# Patient Record
Sex: MALE | Race: WHITE | NOT HISPANIC OR LATINO | Employment: OTHER | URBAN - METROPOLITAN AREA
[De-identification: names, ages, dates, MRNs, and addresses within clinical notes are randomized per-mention and may not be internally consistent; named-entity substitution may affect disease eponyms.]

---

## 2022-01-27 ENCOUNTER — LAB REQUISITION (OUTPATIENT)
Dept: LAB | Facility: HOSPITAL | Age: 67
End: 2022-01-27

## 2022-01-27 DIAGNOSIS — R05.9 COUGH, UNSPECIFIED: ICD-10-CM

## 2022-01-27 LAB — SARS-COV-2 RNA PNL SPEC NAA+PROBE: NOT DETECTED

## 2022-01-27 PROCEDURE — U0004 COV-19 TEST NON-CDC HGH THRU: HCPCS | Performed by: INTERNAL MEDICINE

## 2022-07-07 ENCOUNTER — TELEPHONE (OUTPATIENT)
Dept: GASTROENTEROLOGY | Facility: CLINIC | Age: 67
End: 2022-07-07

## 2022-07-07 NOTE — TELEPHONE ENCOUNTER
Called pt at 01:00pm and again at 01:20pm for Davies call, could not leave a voice mail due to VM box not being set up. If pt calls back we need reschedule his appointment. Wendy

## 2023-04-28 ENCOUNTER — OFFICE VISIT (OUTPATIENT)
Dept: ORTHOPEDIC SURGERY | Facility: CLINIC | Age: 68
End: 2023-04-28
Payer: MEDICARE

## 2023-04-28 VITALS — BODY MASS INDEX: 30.54 KG/M2 | WEIGHT: 238 LBS | HEIGHT: 74 IN

## 2023-04-28 DIAGNOSIS — M17.0 BILATERAL PRIMARY OSTEOARTHRITIS OF KNEE: ICD-10-CM

## 2023-04-28 DIAGNOSIS — M25.561 PAIN IN BOTH KNEES, UNSPECIFIED CHRONICITY: Primary | ICD-10-CM

## 2023-04-28 DIAGNOSIS — M25.562 PAIN IN BOTH KNEES, UNSPECIFIED CHRONICITY: Primary | ICD-10-CM

## 2023-04-28 RX ORDER — ALBUTEROL SULFATE 90 UG/1
AEROSOL, METERED RESPIRATORY (INHALATION)
COMMUNITY
Start: 2023-01-13

## 2023-04-28 RX ORDER — TRIAMCINOLONE ACETONIDE 40 MG/ML
40 INJECTION, SUSPENSION INTRA-ARTICULAR; INTRAMUSCULAR
Status: COMPLETED | OUTPATIENT
Start: 2023-04-28 | End: 2023-04-28

## 2023-04-28 RX ORDER — LIDOCAINE HYDROCHLORIDE 10 MG/ML
5 INJECTION, SOLUTION EPIDURAL; INFILTRATION; INTRACAUDAL; PERINEURAL
Status: COMPLETED | OUTPATIENT
Start: 2023-04-28 | End: 2023-04-28

## 2023-04-28 RX ORDER — POTASSIUM CHLORIDE 750 MG/1
20 TABLET, FILM COATED, EXTENDED RELEASE ORAL 2 TIMES DAILY
COMMUNITY

## 2023-04-28 RX ORDER — ASPIRIN 81 MG/1
81 TABLET ORAL DAILY
COMMUNITY

## 2023-04-28 RX ORDER — AMLODIPINE BESYLATE 10 MG/1
1 TABLET ORAL DAILY
COMMUNITY
Start: 2023-04-04

## 2023-04-28 RX ORDER — IPRATROPIUM BROMIDE AND ALBUTEROL SULFATE 2.5; .5 MG/3ML; MG/3ML
SOLUTION RESPIRATORY (INHALATION)
COMMUNITY
Start: 2023-02-07

## 2023-04-28 RX ORDER — FUROSEMIDE 40 MG/1
TABLET ORAL
COMMUNITY
Start: 2023-03-06

## 2023-04-28 RX ADMIN — TRIAMCINOLONE ACETONIDE 40 MG: 40 INJECTION, SUSPENSION INTRA-ARTICULAR; INTRAMUSCULAR at 09:45

## 2023-04-28 RX ADMIN — LIDOCAINE HYDROCHLORIDE 5 ML: 10 INJECTION, SOLUTION EPIDURAL; INFILTRATION; INTRACAUDAL; PERINEURAL at 09:45

## 2023-04-28 NOTE — PROGRESS NOTES
"Chief Complaint  Pain and Initial Evaluation of the Left Knee and Pain and Initial Evaluation of the Right Knee     Subjective      Harry Mistry presents to [facility] for evaluation of the bilateral knees. He reports bilateral knee pain, right worse than the left knee. He uses topicals for his pain with some relief. He denies previous injury. He denies numbness and tingling.     Allergies   Allergen Reactions   • Naproxen GI Intolerance        Social History     Socioeconomic History   • Marital status: Single   Tobacco Use   • Smoking status: Every Day     Packs/day: 0.50     Years: 20.00     Pack years: 10.00     Types: Cigarettes   • Smokeless tobacco: Never        Review of Systems     Objective   Vital Signs:   Ht 188 cm (74\")   Wt 108 kg (238 lb)   BMI 30.56 kg/m²       Physical Exam  Constitutional:       Appearance: Normal appearance. The patient is well-developed and normal weight.   HENT:      Head: Normocephalic.      Right Ear: Hearing and external ear normal.      Left Ear: Hearing and external ear normal.      Nose: Nose normal.   Eyes:      Conjunctiva/sclera: Conjunctivae normal.   Cardiovascular:      Rate and Rhythm: Normal rate.   Pulmonary:      Effort: Pulmonary effort is normal.      Breath sounds: No wheezing or rales.   Abdominal:      Palpations: Abdomen is soft.      Tenderness: There is no abdominal tenderness.   Musculoskeletal:      Cervical back: Normal range of motion.   Skin:     Findings: No rash.   Neurological:      Mental Status: The patient is alert and oriented to person, place, and time.   Psychiatric:         Mood and Affect: Mood and affect normal.         Judgment: Judgment normal.       Ortho Exam    Right knee- ROM -10 to 115 mild laxity to varus/valgus stress. 1+ effusion. Stable to anterior/posterior drawer. Tender to the posterior knee. Neurovascularly intact. Positive EHL, FHL, GS and TA. Sensation intact to all 5 nerves of the " foot. Positive pulses. Negative Suellen's. Negative Lachman's.     Left knee- ROM -5 to 120 degrees. Mild swelling. Stable to varus/valgus stress. Stable to anterior/posterior drawer. Negative Lachman's. Positive EHL, FHL, GS and TA. Sensation intact to all 5 nerves of the foot. Positive pulses. Mild varus deformity. Negative Suellen's.     Large Joint Arthrocentesis: R knee  Date/Time: 4/28/2023 9:45 AM  Consent given by: patient  Site marked: site marked  Timeout: Immediately prior to procedure a time out was called to verify the correct patient, procedure, equipment, support staff and site/side marked as required   Supporting Documentation  Indications: pain   Procedure Details  Location: knee - R knee  Needle gauge: 21g.  Medications administered: 5 mL lidocaine PF 1% 1 %; 40 mg triamcinolone acetonide 40 MG/ML  Patient tolerance: patient tolerated the procedure well with no immediate complications    Large Joint Arthrocentesis: L knee  Date/Time: 4/28/2023 9:45 AM  Consent given by: patient  Site marked: site marked  Timeout: Immediately prior to procedure a time out was called to verify the correct patient, procedure, equipment, support staff and site/side marked as required   Supporting Documentation  Indications: pain   Procedure Details  Location: knee - L knee  Needle gauge: 21g.  Medications administered: 5 mL lidocaine PF 1% 1 %; 40 mg triamcinolone acetonide 40 MG/ML  Patient tolerance: patient tolerated the procedure well with no immediate complications          X-Ray Report:  Right knee X-Ray  Indication: Evaluation of right knee pain  AP/Lateral, Standing and Sunrise view(s)  Findings: advanced degenerative changes with bone on bone to the medial compartment. No acute fracture. Varus deformity. Small effusion.   Prior studies available for comparison: no     X-Ray Report:  Left knee X-Ray  Indication: Evaluation of left knee pain  AP/Lateral, Standing and Sunrise view(s)  Findings:  advanced  degenerative changes with bone on bone to the medial compartment. No acute fracture. Varus deformity. Small effusion.   Prior studies available for comparison: no         Imaging Results (Most Recent)     Procedure Component Value Units Date/Time    XR Knee 3 View Right [[id number]] Resulted: 04/28/23 0919     Updated: 04/28/23 0923    XR Knee 3 View Left [[id number]] Resulted: 04/28/23 0919     Updated: 04/28/23 0923           Result Review :       No results found.           Assessment and Plan     Diagnoses and all orders for this visit:    1. Pain in both knees, unspecified chronicity (Primary)  -     XR Knee 3 View Left  -     XR Knee 3 View Right    2. Bilateral primary osteoarthritis of knee        Discussed the treatment options with the patient, operative vs non-operative. I reviewed the x-rays that were obtained today with the patient. Discussed the risks and benefits of bilateral knee steroid injections. The patient expressed understanding and wished to proceed. He tolerated the injections well. Home exercises given today.     Educated on risk of smoking. Discussed options for smoking cessation. and Call or return if worsening symptoms.    Follow Up     3 months    Patient was given instructions and counseling regarding his condition or for health maintenance advice. Please see specific information pulled into the AVS if appropriate.     Scribed for Harsha Feliciano MD by Coleen Paige.  04/28/23   09:30 EDT    I have personally performed the services described in this document as scribed by the above individual and it is both accurate and complete. Harsha Feliciano MD 04/28/23

## 2023-05-26 ENCOUNTER — TELEPHONE (OUTPATIENT)
Dept: ORTHOPEDIC SURGERY | Facility: CLINIC | Age: 68
End: 2023-05-26
Payer: MEDICARE

## 2023-05-26 RX ORDER — DICLOFENAC SODIUM 75 MG/1
75 TABLET, DELAYED RELEASE ORAL 2 TIMES DAILY
Qty: 60 TABLET | Refills: 2 | Status: SHIPPED | OUTPATIENT
Start: 2023-05-26

## 2025-01-01 ENCOUNTER — APPOINTMENT (OUTPATIENT)
Dept: GENERAL RADIOLOGY | Facility: HOSPITAL | Age: 70
End: 2025-01-01
Payer: MEDICARE

## 2025-01-01 ENCOUNTER — HOSPITAL ENCOUNTER (INPATIENT)
Facility: HOSPITAL | Age: 70
LOS: 1 days | End: 2025-02-06
Attending: FAMILY MEDICINE | Admitting: FAMILY MEDICINE
Payer: MEDICARE

## 2025-01-01 ENCOUNTER — APPOINTMENT (OUTPATIENT)
Dept: NEUROLOGY | Facility: HOSPITAL | Age: 70
End: 2025-01-01
Payer: MEDICARE

## 2025-01-01 ENCOUNTER — APPOINTMENT (OUTPATIENT)
Dept: CT IMAGING | Facility: HOSPITAL | Age: 70
End: 2025-01-01
Payer: MEDICARE

## 2025-01-01 ENCOUNTER — HOSPITAL ENCOUNTER (INPATIENT)
Dept: OTHER | Facility: HOSPITAL | Age: 70
Discharge: HOME | End: 2025-02-05
Payer: MEDICARE

## 2025-01-01 VITALS
DIASTOLIC BLOOD PRESSURE: 59 MMHG | TEMPERATURE: 101.3 F | HEIGHT: 74 IN | RESPIRATION RATE: 26 BRPM | OXYGEN SATURATION: 55 % | BODY MASS INDEX: 24.36 KG/M2 | WEIGHT: 189.82 LBS | SYSTOLIC BLOOD PRESSURE: 80 MMHG

## 2025-01-01 LAB
ALBUMIN SERPL-MCNC: 2.8 G/DL (ref 3.5–5.2)
ALBUMIN/GLOB SERPL: 0.8 G/DL
ALP SERPL-CCNC: 347 U/L (ref 39–117)
ALT SERPL W P-5'-P-CCNC: 372 U/L (ref 1–41)
AMMONIA BLD-SCNC: 39 UMOL/L (ref 16–60)
ANION GAP SERPL CALCULATED.3IONS-SCNC: 21 MMOL/L (ref 5–15)
ARTERIAL PATENCY WRIST A: ABNORMAL
ARTERIAL PATENCY WRIST A: POSITIVE
AST SERPL-CCNC: 779 U/L (ref 1–40)
ATMOSPHERIC PRESS: 735.7 MMHG
ATMOSPHERIC PRESS: 737.4 MMHG
B PARAPERT DNA SPEC QL NAA+PROBE: NOT DETECTED
B PERT DNA SPEC QL NAA+PROBE: NOT DETECTED
BACTERIA UR QL AUTO: ABNORMAL /HPF
BASE EXCESS BLDA CALC-SCNC: -13.2 MMOL/L (ref -2–2)
BASE EXCESS BLDA CALC-SCNC: -8.1 MMOL/L (ref -2–2)
BDY SITE: ABNORMAL
BDY SITE: ABNORMAL
BILIRUB SERPL-MCNC: 0.5 MG/DL (ref 0–1.2)
BILIRUB UR QL STRIP: ABNORMAL
BUN SERPL-MCNC: 24 MG/DL (ref 8–23)
BUN/CREAT SERPL: 17 (ref 7–25)
C PNEUM DNA NPH QL NAA+NON-PROBE: NOT DETECTED
CA-I BLDA-SCNC: 1.25 MMOL/L (ref 1.13–1.32)
CALCIUM SPEC-SCNC: 8.3 MG/DL (ref 8.6–10.5)
CHLORIDE BLDA-SCNC: 101 MMOL/L (ref 98–107)
CHLORIDE SERPL-SCNC: 96 MMOL/L (ref 98–107)
CLARITY UR: CLEAR
CO2 SERPL-SCNC: 21 MMOL/L (ref 22–29)
COD CRY URNS QL: ABNORMAL /HPF
COLOR UR: ABNORMAL
CREAT SERPL-MCNC: 1.41 MG/DL (ref 0.76–1.27)
D-LACTATE SERPL-SCNC: 5.6 MMOL/L (ref 0.5–2)
D-LACTATE SERPL-SCNC: 8.3 MMOL/L (ref 0.5–2)
D-LACTATE SERPL-SCNC: 9 MMOL/L
D-LACTATE SERPL-SCNC: 9.9 MMOL/L (ref 0.5–2)
DEPRECATED RDW RBC AUTO: 58.4 FL (ref 37–54)
EGFRCR SERPLBLD CKD-EPI 2021: 54.6 ML/MIN/1.73
ERYTHROCYTE [DISTWIDTH] IN BLOOD BY AUTOMATED COUNT: 16 % (ref 12.3–15.4)
FLUAV SUBTYP SPEC NAA+PROBE: NOT DETECTED
FLUBV RNA ISLT QL NAA+PROBE: NOT DETECTED
GLOBULIN UR ELPH-MCNC: 3.3 GM/DL
GLUCOSE BLDC GLUCOMTR-MCNC: 176 MG/DL (ref 70–99)
GLUCOSE BLDC GLUCOMTR-MCNC: 208 MG/DL (ref 70–99)
GLUCOSE SERPL-MCNC: 183 MG/DL (ref 65–99)
GLUCOSE UR STRIP-MCNC: NEGATIVE MG/DL
HADV DNA SPEC NAA+PROBE: NOT DETECTED
HCO3 BLDA-SCNC: 20.8 MMOL/L (ref 22–26)
HCO3 BLDA-SCNC: 23.4 MMOL/L (ref 22–26)
HCOV 229E RNA SPEC QL NAA+PROBE: NOT DETECTED
HCOV HKU1 RNA SPEC QL NAA+PROBE: NOT DETECTED
HCOV NL63 RNA SPEC QL NAA+PROBE: NOT DETECTED
HCOV OC43 RNA SPEC QL NAA+PROBE: DETECTED
HCT VFR BLD AUTO: 43.4 % (ref 37.5–51)
HCT VFR BLD CALC: 41 % (ref 38–51)
HCT VFR BLD CALC: 41 % (ref 38–51)
HEMODILUTION: NO
HEMODILUTION: NO
HGB BLD-MCNC: 12.6 G/DL (ref 13–17.7)
HGB BLDA-MCNC: 14 G/DL (ref 12–18)
HGB BLDA-MCNC: 14.1 G/DL (ref 12–18)
HGB UR QL STRIP.AUTO: NEGATIVE
HMPV RNA NPH QL NAA+NON-PROBE: NOT DETECTED
HPIV1 RNA ISLT QL NAA+PROBE: NOT DETECTED
HPIV2 RNA SPEC QL NAA+PROBE: NOT DETECTED
HPIV3 RNA NPH QL NAA+PROBE: NOT DETECTED
HPIV4 P GENE NPH QL NAA+PROBE: NOT DETECTED
HYALINE CASTS UR QL AUTO: ABNORMAL /LPF
INHALED O2 CONCENTRATION: 100 %
INHALED O2 CONCENTRATION: 90 %
KETONES UR QL STRIP: NEGATIVE
L PNEUMO1 AG UR QL IA: NEGATIVE
LEUKOCYTE ESTERASE UR QL STRIP.AUTO: NEGATIVE
LYMPHOCYTES # BLD MANUAL: 1.05 10*3/MM3 (ref 0.7–3.1)
LYMPHOCYTES NFR BLD MANUAL: 9 % (ref 5–12)
Lab: ABNORMAL
M PNEUMO IGG SER IA-ACNC: NOT DETECTED
MAGNESIUM SERPL-MCNC: 2 MG/DL (ref 1.6–2.4)
MCH RBC QN AUTO: 28.4 PG (ref 26.6–33)
MCHC RBC AUTO-ENTMCNC: 29 G/DL (ref 31.5–35.7)
MCV RBC AUTO: 98 FL (ref 79–97)
METAMYELOCYTES NFR BLD MANUAL: 2 % (ref 0–0)
MODALITY: ABNORMAL
MODALITY: ABNORMAL
MONOCYTES # BLD: 3.14 10*3/MM3 (ref 0.1–0.9)
MRSA DNA SPEC QL NAA+PROBE: ABNORMAL
MUCOUS THREADS URNS QL MICRO: ABNORMAL /HPF
NEUTROPHILS # BLD AUTO: 30.01 10*3/MM3 (ref 1.7–7)
NEUTROPHILS NFR BLD MANUAL: 77 % (ref 42.7–76)
NEUTS BAND NFR BLD MANUAL: 9 % (ref 0–5)
NITRITE UR QL STRIP: NEGATIVE
NOTIFIED WHO: ABNORMAL
NT-PROBNP SERPL-MCNC: 3910 PG/ML (ref 0–900)
PCO2 BLDA: 77.4 MM HG (ref 35–45)
PCO2 BLDA: 93.2 MM HG (ref 35–45)
PEEP RESPIRATORY: 5 CM[H2O]
PEEP RESPIRATORY: 5 CM[H2O]
PH BLDA: 6.96 PH UNITS (ref 7.35–7.45)
PH BLDA: 7.09 PH UNITS (ref 7.35–7.45)
PH UR STRIP.AUTO: 5.5 [PH] (ref 5–8)
PHOSPHATE SERPL-MCNC: 8.8 MG/DL (ref 2.5–4.5)
PLATELET # BLD AUTO: 404 10*3/MM3 (ref 140–450)
PMV BLD AUTO: 10.8 FL (ref 6–12)
PO2 BLD: 84 MM[HG] (ref 0–500)
PO2 BLD: 99 MM[HG] (ref 0–500)
PO2 BLDA: 84 MM HG (ref 80–100)
PO2 BLDA: 88.7 MM HG (ref 80–100)
POTASSIUM BLDA-SCNC: 4.3 MMOL/L (ref 3.5–5)
POTASSIUM SERPL-SCNC: 4.2 MMOL/L (ref 3.5–5.2)
PROCALCITONIN SERPL-MCNC: 5.54 NG/ML (ref 0–0.25)
PROT SERPL-MCNC: 6.1 G/DL (ref 6–8.5)
PROT UR QL STRIP: ABNORMAL
QT INTERVAL: 437 MS
QTC INTERVAL: 533 MS
RBC # BLD AUTO: 4.43 10*6/MM3 (ref 4.14–5.8)
RBC # UR STRIP: ABNORMAL /HPF
RBC MORPH BLD: NORMAL
READ BACK: YES
REF LAB TEST METHOD: ABNORMAL
RESPIRATORY RATE: 26
RESPIRATORY RATE: 26
RHINOVIRUS RNA SPEC NAA+PROBE: NOT DETECTED
RSV RNA NPH QL NAA+NON-PROBE: NOT DETECTED
S PNEUM AG SPEC QL LA: NEGATIVE
SAO2 % BLDCOA: 86.4 % (ref 95–99)
SAO2 % BLDCOA: 91.8 % (ref 95–99)
SARS-COV-2 RNA RESP QL NAA+PROBE: DETECTED
SCAN SLIDE: NORMAL
SMALL PLATELETS BLD QL SMEAR: ADEQUATE
SODIUM BLD-SCNC: 140 MMOL/L (ref 131–143)
SODIUM SERPL-SCNC: 138 MMOL/L (ref 136–145)
SP GR UR STRIP: 1.02 (ref 1–1.03)
SPERM URNS QL MICRO: ABNORMAL /HPF
SQUAMOUS #/AREA URNS HPF: ABNORMAL /HPF
UROBILINOGEN UR QL STRIP: ABNORMAL
VARIANT LYMPHS NFR BLD MANUAL: 1 % (ref 19.6–45.3)
VARIANT LYMPHS NFR BLD MANUAL: 2 % (ref 0–5)
VENTILATOR MODE: ABNORMAL
VENTILATOR MODE: AC
VT ON VENT VENT: 500 ML
VT ON VENT VENT: 500 ML
WBC # UR STRIP: ABNORMAL /HPF
WBC MORPH BLD: NORMAL
WBC NRBC COR # BLD AUTO: 34.9 10*3/MM3 (ref 3.4–10.8)

## 2025-01-01 PROCEDURE — 25810000003 LACTATED RINGERS PER 1000 ML: Performed by: FAMILY MEDICINE

## 2025-01-01 PROCEDURE — 82803 BLOOD GASES ANY COMBINATION: CPT | Performed by: INTERNAL MEDICINE

## 2025-01-01 PROCEDURE — 94799 UNLISTED PULMONARY SVC/PX: CPT

## 2025-01-01 PROCEDURE — 94002 VENT MGMT INPAT INIT DAY: CPT

## 2025-01-01 PROCEDURE — 83605 ASSAY OF LACTIC ACID: CPT

## 2025-01-01 PROCEDURE — 25010000003 VASOPRESSIN 0.2 UNITS/ML SOLUTION: Performed by: PHYSICIAN ASSISTANT

## 2025-01-01 PROCEDURE — 25010000003 DEXTROSE 5 % SOLUTION: Performed by: FAMILY MEDICINE

## 2025-01-01 PROCEDURE — 83735 ASSAY OF MAGNESIUM: CPT | Performed by: INTERNAL MEDICINE

## 2025-01-01 PROCEDURE — 99291 CRITICAL CARE FIRST HOUR: CPT | Performed by: INTERNAL MEDICINE

## 2025-01-01 PROCEDURE — 25810000003 SODIUM CHLORIDE 0.9 % SOLUTION: Performed by: INTERNAL MEDICINE

## 2025-01-01 PROCEDURE — 87205 SMEAR GRAM STAIN: CPT | Performed by: INTERNAL MEDICINE

## 2025-01-01 PROCEDURE — 25010000002 EPINEPHRINE 1 MG/ML SOLUTION: Performed by: INTERNAL MEDICINE

## 2025-01-01 PROCEDURE — 80051 ELECTROLYTE PANEL: CPT

## 2025-01-01 PROCEDURE — 95822 EEG COMA OR SLEEP ONLY: CPT | Performed by: PSYCHIATRY & NEUROLOGY

## 2025-01-01 PROCEDURE — 87449 NOS EACH ORGANISM AG IA: CPT | Performed by: INTERNAL MEDICINE

## 2025-01-01 PROCEDURE — 25010000002 LORAZEPAM PER 2 MG: Performed by: INTERNAL MEDICINE

## 2025-01-01 PROCEDURE — 87186 SC STD MICRODIL/AGAR DIL: CPT | Performed by: INTERNAL MEDICINE

## 2025-01-01 PROCEDURE — 25810000003 SODIUM CHLORIDE 0.9 % SOLUTION: Performed by: FAMILY MEDICINE

## 2025-01-01 PROCEDURE — 81001 URINALYSIS AUTO W/SCOPE: CPT | Performed by: INTERNAL MEDICINE

## 2025-01-01 PROCEDURE — 25010000002 VANCOMYCIN 5 G RECONSTITUTED SOLUTION: Performed by: FAMILY MEDICINE

## 2025-01-01 PROCEDURE — 95816 EEG AWAKE AND DROWSY: CPT

## 2025-01-01 PROCEDURE — 94761 N-INVAS EAR/PLS OXIMETRY MLT: CPT

## 2025-01-01 PROCEDURE — 0BH17EZ INSERTION OF ENDOTRACHEAL AIRWAY INTO TRACHEA, VIA NATURAL OR ARTIFICIAL OPENING: ICD-10-PCS | Performed by: FAMILY MEDICINE

## 2025-01-01 PROCEDURE — 74018 RADEX ABDOMEN 1 VIEW: CPT

## 2025-01-01 PROCEDURE — 5A1935Z RESPIRATORY VENTILATION, LESS THAN 24 CONSECUTIVE HOURS: ICD-10-PCS | Performed by: FAMILY MEDICINE

## 2025-01-01 PROCEDURE — 85025 COMPLETE CBC W/AUTO DIFF WBC: CPT | Performed by: FAMILY MEDICINE

## 2025-01-01 PROCEDURE — 25010000002 CEFEPIME PER 500 MG: Performed by: FAMILY MEDICINE

## 2025-01-01 PROCEDURE — 71045 X-RAY EXAM CHEST 1 VIEW: CPT

## 2025-01-01 PROCEDURE — 84145 PROCALCITONIN (PCT): CPT | Performed by: INTERNAL MEDICINE

## 2025-01-01 PROCEDURE — 82803 BLOOD GASES ANY COMBINATION: CPT

## 2025-01-01 PROCEDURE — 70450 CT HEAD/BRAIN W/O DYE: CPT

## 2025-01-01 PROCEDURE — 87641 MR-STAPH DNA AMP PROBE: CPT | Performed by: INTERNAL MEDICINE

## 2025-01-01 PROCEDURE — 82948 REAGENT STRIP/BLOOD GLUCOSE: CPT

## 2025-01-01 PROCEDURE — 0202U NFCT DS 22 TRGT SARS-COV-2: CPT | Performed by: INTERNAL MEDICINE

## 2025-01-01 PROCEDURE — 25010000002 GLYCOPYRROLATE 0.2 MG/ML SOLUTION: Performed by: INTERNAL MEDICINE

## 2025-01-01 PROCEDURE — 36600 WITHDRAWAL OF ARTERIAL BLOOD: CPT

## 2025-01-01 PROCEDURE — 80053 COMPREHEN METABOLIC PANEL: CPT | Performed by: FAMILY MEDICINE

## 2025-01-01 PROCEDURE — 99223 1ST HOSP IP/OBS HIGH 75: CPT | Performed by: FAMILY MEDICINE

## 2025-01-01 PROCEDURE — 84100 ASSAY OF PHOSPHORUS: CPT | Performed by: INTERNAL MEDICINE

## 2025-01-01 PROCEDURE — 94664 DEMO&/EVAL PT USE INHALER: CPT

## 2025-01-01 PROCEDURE — 83880 ASSAY OF NATRIURETIC PEPTIDE: CPT | Performed by: FAMILY MEDICINE

## 2025-01-01 PROCEDURE — 87147 CULTURE TYPE IMMUNOLOGIC: CPT | Performed by: INTERNAL MEDICINE

## 2025-01-01 PROCEDURE — 85007 BL SMEAR W/DIFF WBC COUNT: CPT | Performed by: FAMILY MEDICINE

## 2025-01-01 PROCEDURE — 87070 CULTURE OTHR SPECIMN AEROBIC: CPT | Performed by: INTERNAL MEDICINE

## 2025-01-01 PROCEDURE — 93005 ELECTROCARDIOGRAM TRACING: CPT | Performed by: FAMILY MEDICINE

## 2025-01-01 PROCEDURE — 87040 BLOOD CULTURE FOR BACTERIA: CPT | Performed by: FAMILY MEDICINE

## 2025-01-01 PROCEDURE — 82140 ASSAY OF AMMONIA: CPT | Performed by: PHYSICIAN ASSISTANT

## 2025-01-01 PROCEDURE — 82330 ASSAY OF CALCIUM: CPT

## 2025-01-01 PROCEDURE — 25010000002 MORPHINE PER 10 MG: Performed by: INTERNAL MEDICINE

## 2025-01-01 PROCEDURE — 36600 WITHDRAWAL OF ARTERIAL BLOOD: CPT | Performed by: INTERNAL MEDICINE

## 2025-01-01 RX ORDER — PETROLATUM,WHITE
1 OINTMENT IN PACKET (GRAM) TOPICAL AS NEEDED
COMMUNITY
Start: 2025-02-14

## 2025-01-01 RX ORDER — BISACODYL 5 MG/1
5 TABLET, DELAYED RELEASE ORAL DAILY PRN
Status: DISCONTINUED | OUTPATIENT
Start: 2025-01-01 | End: 2025-01-01

## 2025-01-01 RX ORDER — FLUTICASONE FUROATE, UMECLIDINIUM BROMIDE AND VILANTEROL TRIFENATATE 100; 62.5; 25 UG/1; UG/1; UG/1
1 POWDER RESPIRATORY (INHALATION) EVERY MORNING
COMMUNITY

## 2025-01-01 RX ORDER — CHLORHEXIDINE GLUCONATE 0.12 MG/ML
15 RINSE ORAL EVERY 12 HOURS SCHEDULED
Status: DISCONTINUED | OUTPATIENT
Start: 2025-01-01 | End: 2025-01-01 | Stop reason: HOSPADM

## 2025-01-01 RX ORDER — AMOXICILLIN 250 MG
2 CAPSULE ORAL 2 TIMES DAILY
Status: DISCONTINUED | OUTPATIENT
Start: 2025-01-01 | End: 2025-01-01

## 2025-01-01 RX ORDER — GLYCOPYRROLATE 0.2 MG/ML
0.4 INJECTION INTRAMUSCULAR; INTRAVENOUS
Status: DISCONTINUED | OUTPATIENT
Start: 2025-01-01 | End: 2025-01-01 | Stop reason: HOSPADM

## 2025-01-01 RX ORDER — HEPARIN SODIUM 5000 [USP'U]/ML
5000 INJECTION, SOLUTION INTRAVENOUS; SUBCUTANEOUS EVERY 12 HOURS SCHEDULED
Status: DISCONTINUED | OUTPATIENT
Start: 2025-01-01 | End: 2025-01-01

## 2025-01-01 RX ORDER — FENTANYL CITRATE-0.9 % NACL/PF 10 MCG/ML
50-300 PLASTIC BAG, INJECTION (ML) INTRAVENOUS
Status: DISCONTINUED | OUTPATIENT
Start: 2025-01-01 | End: 2025-01-01 | Stop reason: HOSPADM

## 2025-01-01 RX ORDER — ACETAMINOPHEN 325 MG/1
650 TABLET ORAL EVERY 4 HOURS PRN
Status: DISCONTINUED | OUTPATIENT
Start: 2025-01-01 | End: 2025-01-01

## 2025-01-01 RX ORDER — DOPAMINE HYDROCHLORIDE 160 MG/100ML
2-20 INJECTION, SOLUTION INTRAVENOUS
Status: DISCONTINUED | OUTPATIENT
Start: 2025-01-01 | End: 2025-01-01

## 2025-01-01 RX ORDER — SODIUM CHLORIDE 0.9 % (FLUSH) 0.9 %
10 SYRINGE (ML) INJECTION EVERY 12 HOURS SCHEDULED
Status: DISCONTINUED | OUTPATIENT
Start: 2025-01-01 | End: 2025-01-01 | Stop reason: HOSPADM

## 2025-01-01 RX ORDER — NITROGLYCERIN 0.4 MG/1
0.4 TABLET SUBLINGUAL
Status: DISCONTINUED | OUTPATIENT
Start: 2025-01-01 | End: 2025-01-01

## 2025-01-01 RX ORDER — POLYETHYLENE GLYCOL 3350 17 G/17G
17 POWDER, FOR SOLUTION ORAL DAILY PRN
Status: DISCONTINUED | OUTPATIENT
Start: 2025-01-01 | End: 2025-01-01

## 2025-01-01 RX ORDER — ONDANSETRON 4 MG/1
4 TABLET, ORALLY DISINTEGRATING ORAL EVERY 6 HOURS PRN
COMMUNITY

## 2025-01-01 RX ORDER — BISACODYL 10 MG
10 SUPPOSITORY, RECTAL RECTAL DAILY PRN
Status: DISCONTINUED | OUTPATIENT
Start: 2025-01-01 | End: 2025-01-01

## 2025-01-01 RX ORDER — SODIUM CHLORIDE, SODIUM LACTATE, POTASSIUM CHLORIDE, CALCIUM CHLORIDE 600; 310; 30; 20 MG/100ML; MG/100ML; MG/100ML; MG/100ML
75 INJECTION, SOLUTION INTRAVENOUS CONTINUOUS
Status: DISCONTINUED | OUTPATIENT
Start: 2025-01-01 | End: 2025-01-01

## 2025-01-01 RX ORDER — SODIUM CHLORIDE 9 MG/ML
40 INJECTION, SOLUTION INTRAVENOUS AS NEEDED
Status: DISCONTINUED | OUTPATIENT
Start: 2025-01-01 | End: 2025-01-01 | Stop reason: HOSPADM

## 2025-01-01 RX ORDER — NOREPINEPHRINE BITARTRATE 0.03 MG/ML
.02-.3 INJECTION, SOLUTION INTRAVENOUS
Status: DISCONTINUED | OUTPATIENT
Start: 2025-01-01 | End: 2025-01-01

## 2025-01-01 RX ORDER — PANTOPRAZOLE SODIUM 40 MG/10ML
40 INJECTION, POWDER, LYOPHILIZED, FOR SOLUTION INTRAVENOUS
Status: DISCONTINUED | OUTPATIENT
Start: 2025-01-01 | End: 2025-01-01

## 2025-01-01 RX ORDER — IPRATROPIUM BROMIDE AND ALBUTEROL SULFATE 2.5; .5 MG/3ML; MG/3ML
3 SOLUTION RESPIRATORY (INHALATION) EVERY 4 HOURS PRN
Status: DISCONTINUED | OUTPATIENT
Start: 2025-01-01 | End: 2025-01-01

## 2025-01-01 RX ORDER — SODIUM CHLORIDE 0.9 % (FLUSH) 0.9 %
10 SYRINGE (ML) INJECTION AS NEEDED
Status: DISCONTINUED | OUTPATIENT
Start: 2025-01-01 | End: 2025-01-01 | Stop reason: HOSPADM

## 2025-01-01 RX ORDER — BUDESONIDE 0.5 MG/2ML
0.5 INHALANT ORAL 2 TIMES DAILY
COMMUNITY

## 2025-01-01 RX ORDER — IPRATROPIUM BROMIDE AND ALBUTEROL SULFATE 2.5; .5 MG/3ML; MG/3ML
3 SOLUTION RESPIRATORY (INHALATION)
Status: DISCONTINUED | OUTPATIENT
Start: 2025-01-01 | End: 2025-01-01

## 2025-01-01 RX ORDER — NOREPINEPHRINE BITARTRATE 0.03 MG/ML
INJECTION, SOLUTION INTRAVENOUS
Status: COMPLETED
Start: 2025-01-01 | End: 2025-01-01

## 2025-01-01 RX ORDER — VANCOMYCIN/0.9 % SOD CHLORIDE 1.5G/250ML
1500 PLASTIC BAG, INJECTION (ML) INTRAVENOUS EVERY 24 HOURS
Status: DISCONTINUED | OUTPATIENT
Start: 2025-01-01 | End: 2025-01-01

## 2025-01-01 RX ORDER — MORPHINE SULFATE 2 MG/ML
2 INJECTION, SOLUTION INTRAMUSCULAR; INTRAVENOUS ONCE
Status: COMPLETED | OUTPATIENT
Start: 2025-01-01 | End: 2025-01-01

## 2025-01-01 RX ORDER — GLYCOPYRROLATE 0.2 MG/ML
0.4 INJECTION INTRAMUSCULAR; INTRAVENOUS ONCE
Status: COMPLETED | OUTPATIENT
Start: 2025-01-01 | End: 2025-01-01

## 2025-01-01 RX ORDER — ACETAMINOPHEN 650 MG/1
650 SUPPOSITORY RECTAL EVERY 4 HOURS PRN
Status: DISCONTINUED | OUTPATIENT
Start: 2025-01-01 | End: 2025-01-01

## 2025-01-01 RX ORDER — LORAZEPAM 2 MG/ML
1 INJECTION INTRAMUSCULAR
Status: DISCONTINUED | OUTPATIENT
Start: 2025-01-01 | End: 2025-01-01 | Stop reason: HOSPADM

## 2025-01-01 RX ORDER — LORAZEPAM 2 MG/ML
1 INJECTION INTRAMUSCULAR ONCE
Status: COMPLETED | OUTPATIENT
Start: 2025-01-01 | End: 2025-01-01

## 2025-01-01 RX ADMIN — VANCOMYCIN HYDROCHLORIDE 1750 MG: 5 INJECTION, POWDER, LYOPHILIZED, FOR SOLUTION INTRAVENOUS at 06:59

## 2025-01-01 RX ADMIN — SODIUM BICARBONATE 150 MEQ: 84 INJECTION INTRAVENOUS at 05:24

## 2025-01-01 RX ADMIN — SODIUM BICARBONATE 50 MEQ: 84 INJECTION INTRAVENOUS at 04:43

## 2025-01-01 RX ADMIN — LORAZEPAM 1 MG: 2 INJECTION INTRAMUSCULAR; INTRAVENOUS at 16:13

## 2025-01-01 RX ADMIN — NOREPINEPHRINE BITARTRATE 0.02 MCG/KG/MIN: 0.03 INJECTION, SOLUTION INTRAVENOUS at 04:46

## 2025-01-01 RX ADMIN — NOREPINEPHRINE BITARTRATE 0.3 MCG/KG/MIN: 0.03 INJECTION, SOLUTION INTRAVENOUS at 07:50

## 2025-01-01 RX ADMIN — IPRATROPIUM BROMIDE AND ALBUTEROL SULFATE 3 ML: .5; 3 SOLUTION RESPIRATORY (INHALATION) at 14:59

## 2025-01-01 RX ADMIN — VASOPRESSIN IN 0.9% SODIUM CHLORIDE 0.03 UNITS/MIN: 20 INJECTION INTRAVENOUS at 05:59

## 2025-01-01 RX ADMIN — GLYCOPYRROLATE 0.4 MG: 0.2 INJECTION INTRAMUSCULAR; INTRAVENOUS at 16:12

## 2025-01-01 RX ADMIN — MORPHINE SULFATE 2 MG: 2 INJECTION, SOLUTION INTRAMUSCULAR; INTRAVENOUS at 16:13

## 2025-01-01 RX ADMIN — SODIUM CHLORIDE, POTASSIUM CHLORIDE, SODIUM LACTATE AND CALCIUM CHLORIDE 75 ML/HR: 600; 310; 30; 20 INJECTION, SOLUTION INTRAVENOUS at 04:52

## 2025-01-01 RX ADMIN — IPRATROPIUM BROMIDE AND ALBUTEROL SULFATE 3 ML: .5; 3 SOLUTION RESPIRATORY (INHALATION) at 08:38

## 2025-01-01 RX ADMIN — PANTOPRAZOLE SODIUM 40 MG: 40 INJECTION, POWDER, FOR SOLUTION INTRAVENOUS at 06:01

## 2025-01-01 RX ADMIN — ACETAMINOPHEN 650 MG: 325 TABLET ORAL at 14:05

## 2025-01-01 RX ADMIN — CEFEPIME 2000 MG: 2 INJECTION, POWDER, FOR SOLUTION INTRAVENOUS at 06:02

## 2025-01-01 RX ADMIN — Medication 10 ML: at 09:05

## 2025-01-01 RX ADMIN — MUPIROCIN 1 APPLICATION: 20 OINTMENT TOPICAL at 05:24

## 2025-01-01 RX ADMIN — CEFEPIME 2000 MG: 2 INJECTION, POWDER, FOR SOLUTION INTRAVENOUS at 14:05

## 2025-01-01 RX ADMIN — Medication 0.3 MCG/KG/MIN: at 06:37

## 2025-01-01 RX ADMIN — NOREPINEPHRINE BITARTRATE 0.28 MCG/KG/MIN: 0.03 INJECTION, SOLUTION INTRAVENOUS at 13:02

## 2025-02-06 PROBLEM — J90 PLEURAL EFFUSION, RIGHT: Status: ACTIVE | Noted: 2025-01-01

## 2025-02-06 PROBLEM — J96.01 ACUTE RESPIRATORY FAILURE WITH HYPOXIA AND HYPERCAPNIA: Status: ACTIVE | Noted: 2025-01-01

## 2025-02-06 PROBLEM — E87.20 LACTIC ACIDOSIS: Status: ACTIVE | Noted: 2025-01-01

## 2025-02-06 PROBLEM — I46.9 CARDIAC ARREST: Status: ACTIVE | Noted: 2025-01-01

## 2025-02-06 PROBLEM — R65.21 SHOCK, SEPTIC: Status: ACTIVE | Noted: 2025-01-01

## 2025-02-06 PROBLEM — J18.9 MULTIFOCAL PNEUMONIA: Status: ACTIVE | Noted: 2025-01-01

## 2025-02-06 PROBLEM — J96.02 ACUTE RESPIRATORY FAILURE WITH HYPOXIA AND HYPERCAPNIA: Status: ACTIVE | Noted: 2025-01-01

## 2025-02-06 PROBLEM — A41.9 SHOCK, SEPTIC: Status: ACTIVE | Noted: 2025-01-01

## 2025-02-06 NOTE — PLAN OF CARE
Pt arrived from via EMS from nursing home with cardiac arrest. Pt was intubated in route. Vent settings are ac/vc rate 26, vt 500, +5, and 100%.     Problem: Mechanical Ventilation Invasive  Goal: Mechanical Ventilation Liberation  Outcome: Not Progressing     Problem: Adult Inpatient Plan of Care  Goal: Plan of Care Review  Outcome: Progressing  Flowsheets (Taken 2/6/2025 2876)  Plan of Care Reviewed With: patient  Goal: Patient-Specific Goal (Individualized)  Outcome: Progressing  Goal: Absence of Hospital-Acquired Illness or Injury  Outcome: Progressing  Goal: Optimal Comfort and Wellbeing  Outcome: Progressing  Goal: Readiness for Transition of Care  Outcome: Progressing     Problem: Restraint, Nonviolent  Goal: Absence of Harm or Injury  Outcome: Progressing     Problem: Mechanical Ventilation Invasive  Goal: Effective Communication  Outcome: Progressing  Goal: Optimal Device Function  Outcome: Progressing  Goal: Optimal Nutrition Delivery  Outcome: Progressing  Goal: Absence of Device-Related Skin and Tissue Injury  Outcome: Progressing  Goal: Absence of Ventilator-Induced Lung Injury  Outcome: Progressing   Goal Outcome Evaluation:  Plan of Care Reviewed With: patient

## 2025-02-06 NOTE — PROGRESS NOTES
RT EQUIPMENT DEVICE RELATED - SKIN ASSESSMENT    RT Medical Equipment/Device:  ETT Hernandez/[employer]    Skin Assessment: Nares: Intact, Nose: Intact, Lips: Intact, and Mouth: Intact    Device Skin Pressure Protection: Skin-to-device areas padded: ETT Hernandez (Commercial)    Nurse Notification: Melita Lagos, CRT

## 2025-02-06 NOTE — PROGRESS NOTES
[facility] Clinical Pharmacy Services: Vancomycin Pharmacokinetic Initial Consult Note    Harry Mistry is a 69 y.o. male who is on day 1 of pharmacy to dose vancomycin for Empiric.    Consult Information  Consulting Provider: FREDRICK Sánchez  Planned Duration of Therapy: 7 days  Was Patient Receiving Prior to Admission/Consult?: No  Loading Dose Ordered or Given: 1750 mg on 2/6 at 0659  PK/PD Target: -600 mg/L.hr   Other Antimicrobials: Cefepime    Imaging Reviewed?: Yes    Microbiology Data  MRSA PCR performed: 02/06/25; Result: Positive (will continue to follow other cultures to guide continued clinical need)  Culture/Source:   Microbiology Results (last 10 days)       Procedure Component Value - Date/Time    Respiratory Panel PCR w/COVID-19(SARS-CoV-2) STEVAN/MARION/MONSTER/PAD/COR/DIMITRY In-House, NP Swab in UTM/VTM, 2 HR TAT - Swab, Nasopharynx [[id number]]  (Abnormal) Collected: 02/06/25 0516    Lab Status: Final result Specimen: Swab from Nasopharynx Updated: 02/06/25 0735     ADENOVIRUS, PCR Not Detected     Coronavirus 229E Not Detected     Coronavirus HKU1 Not Detected     Coronavirus NL63 Not Detected     Coronavirus OC43 Detected     COVID19 Detected     Human Metapneumovirus Not Detected     Human Rhinovirus/Enterovirus Not Detected     Influenza A PCR Not Detected     Influenza B PCR Not Detected     Parainfluenza Virus 1 Not Detected     Parainfluenza Virus 2 Not Detected     Parainfluenza Virus 3 Not Detected     Parainfluenza Virus 4 Not Detected     RSV, PCR Not Detected     Bordetella pertussis pcr Not Detected     Bordetella parapertussis PCR Not Detected     Chlamydophila pneumoniae PCR Not Detected     Mycoplasma pneumo by PCR Not Detected    Narrative:      In the setting of a positive respiratory panel with a viral infection PLUS a negative procalcitonin without other underlying concern for bacterial infection, consider observing off antibiotics or discontinuation of antibiotics and continue  "supportive care. If the respiratory panel is positive for atypical bacterial infection (Bordetella pertussis, Chlamydophila pneumoniae, or Mycoplasma pneumoniae), consider antibiotic de-escalation to target atypical bacterial infection.    MRSA Screen, PCR (Inpatient) - Swab, Nares [[id number]]  (Abnormal) Collected: 25    Lab Status: Final result Specimen: Swab from Nares Updated: 25 0734     MRSA PCR MRSA Detected    Narrative:      The negative predictive value of this diagnostic test is high and should only be used to consider de-escalating anti-MRSA therapy. A positive result may indicate colonization with MRSA and must be correlated clinically.    Legionella Antigen, Urine - Urine, Urine, Clean Catch [[id number]]  (Normal) Collected: 25    Lab Status: Final result Specimen: Urine, Clean Catch Updated: 25     LEGIONELLA ANTIGEN, URINE Negative    S. Pneumo Ag Urine or CSF - Urine, Urine, Clean Catch [[id number]]  (Normal) Collected: 25    Lab Status: Final result Specimen: Urine, Clean Catch Updated: 25 0710     Strep Pneumo Ag Negative    Respiratory Culture - Sputum, ET Suction [[id number]] Collected: 25 0433    Lab Status: Preliminary result Specimen: Sputum from ET Suction Updated: 25 0851     Gram Stain Occasional WBCs seen      Rare (1+) Gram positive cocci    COVID-19, A[facility]OTT IN-HOUSE,NASAL Swab (NO TRANSPORT MEDIA) 2 HR TAT - , [[id number]] Collected: 25 0005    Lab Status: Final result Updated: 250    Influenza Antigen, Rapid - , [[id number]] Collected: 25 0005    Lab Status: Final result Updated: 25 0400              Vitals/Labs  Ht: 188 cm (74.02\"); Wt: 86.1 kg (189 lb 13.1 oz)  Temp (24hrs), Av.1 °F (36.7 °C), Min:97.5 °F (36.4 °C), Max:99.1 °F (37.3 °C)   Estimated Creatinine Clearance: 61.9 mL/min (A) (by C-G formula based on SCr of 1.41 mg/dL (H)).     Results from last 7 days   Lab Units 25  0505 "   CREATININE mg/dL 1.41*   WBC 10*3/mm3 34.90*     Assessment/Plan:    Vancomycin Dose:  1500 mg IV every 24 hours; which provides the following predicted parameters:  Exposure target: AUC24 (range)400-600 mg/L.hr   AUC24,ss: 547 mg/L.hr  Probability of AUC24 > 400: 82 %  Ctrough,ss: 16.5 mg/L  Probability of Ctrough,ss > 20: 33 %  Probability of nephrotoxicity (Lodise HAO 2009): 12 %  Vanc Random ordered for 2/7 at 0600  Patient has order for Basic Metabolic Panel    Pharmacy will follow patient's kidney function and will adjust doses and obtain levels as necessary. Thank you for involving pharmacy in this patient's care. Please contact pharmacy with any questions or concerns.                           Susan Corona  Clinical Pharmacist

## 2025-02-06 NOTE — H&P
[facility]   HISTORY AND PHYSICAL    Patient Name: Harry Mistry  : 1955  MRN: [id number]  Primary Care Physician:  Bonilla Oliveros MD  Date of admission: 2025    Subjective   Subjective     Chief Complaint: Cardiac arrest, unresponsive, respiratory failure    HPI:    Harry Mistry is a 69 y.o. male with past medical history of hypertension, liver mass, retroperitoneal mass, and failure to thrive was transferred to this facility from [facility] for management of septic shock, cardiac arrest and acute respiratory failure.  Patient presented to outside facility from nursing home after staff found him unresponsive on the floor with agonal breathing and faint pulse.  EMS was called immediately and on arrival patient was found to be bradycardic, apneic, unresponsive, and with fixed/dilated pupils.  Patient coded en route to the ED CPR was started and was still pulseless on arrival to the ED. Patient was resuscitated in the ED and subsequently intubated and started on pressors.  Chest x-ray showed bilateral pneumonia with effusion and labs showing lactic acidosis, leukocytosis and elevated CRP.  Patient also started on empiric antibiotics and fluids.  Due to lack of intensive care services patient was transferred here for higher level of care.  Family was contacted at outside facility and requested that patient be full code going forward.  At our facility patient arrived on dobutamine and epinephrine drips and was still hypotensive, hypoxic (86%), and tachycardic.  Patient was on ventilator set to 100% oxygen.  Labs showed that his lactic acid trended up to 13, with labs showing elevated procalcitonin, severe leukocytosis, anemia, reduced renal function, abnormal LFTs, hypoalbuminemia, UA with signs of dehydration, and ABG showing mixed hypercapnic respiratory failure with acidosis.  X-ray showed large right pleural effusion along with bilateral infiltrates.  When seen patient was  intubated and on vent with fixed pupils and occasional seizure activity.        Review of Systems   Patient intubated and on vent    Personal History     Past Medical History:   Diagnosis Date    Failure to thrive in adult     Hypertension     Liver mass     Retroperitoneal mass        No past surgical history on file.    Family History: family history is not on file. Otherwise pertinent FHx was reviewed and not pertinent to current issue.    Social History:  reports that he has been smoking cigarettes. He has a 10 pack-year smoking history. He has never used smokeless tobacco.    Home Medications:  albuterol sulfate HFA, amLODIPine, aspirin, diclofenac, furosemide, ipratropium-albuterol, and potassium chloride      Allergies:  Allergies   Allergen Reactions    Naproxen GI Intolerance       Objective   Objective     Vitals:   Temp:  [97.7 °F (36.5 °C)] 97.7 °F (36.5 °C)  Heart Rate:  [] 99  BP: ()/(42-65) 129/65  Flow (L/min) (Oxygen Therapy):  [100] 100  FiO2 (%):  [100 %] 100 %  Physical Exam    Constitutional: Patient intubated and on vent   Eyes: Pupils fixed, nonreactive, no corneal reflex   HENT: NCAT, mucous membranes dry   Neck: Supple, no thyromegaly, no lymphadenopathy, trachea midline   Respiratory: Decreased breath sounds on the right, mechanical respirations, rhonchi intubated and on vent   Cardiovascular: Tachycardia, no murmurs, rubs, or gallops, palpable pedal pulses bilaterally   Gastrointestinal: Hypoactive bowel sounds, soft, nondistended   Musculoskeletal: No bilateral ankle edema   Psychiatric: Intubated and on vent   Neurologic: Intubated on vent, dilated pupils, nonreactive, no corneal reflex   Skin: No rashes     Result Review    Result Review:  I have personally reviewed the results from the time of this admission to 2/6/2025 05:15 EST and agree with these findings:  [x]  Laboratory list / accordion  []  Microbiology  [x]  Radiology  [x]  EKG/Telemetry   []  Cardiology/Vascular    []  Pathology  []  Old records  Patient was resuscitated in the ED other:  Most notable findings include: Lactic acid trended up to 13, with labs showing elevated procalcitonin, severe leukocytosis, anemia, reduced renal function, abnormal LFTs, hypoalbuminemia, UA with signs of dehydration, and ABG showing mixed hypercapnic respiratory failure with acidosis.  X-ray showed large right pleural effusion along with bilateral infiltrates.      Assessment & Plan   Assessment / Plan     Brief Patient Summary:  Harry Mistry is a 69 y.o. male with past medical history of hypertension, liver mass, retroperitoneal mass, and failure to thrive was transferred to this facility from [facility] for management of septic shock, cardiac arrest and acute respiratory failure.     Active Hospital Problems:  Active Hospital Problems    Diagnosis     **Cardiac arrest     Acute respiratory failure with hypoxia and hypercapnia     Multifocal pneumonia     Pleural effusion, right     Shock, septic     Lactic acidosis      Plan:     Cardiac arrest/septic shock  -Admit intensive care unit  -Patient intubated and on vent  -Vent set to 100% oxygen  -Chest x-ray reviewed, large right pleural effusion  -ABG reviewed  -Severe lactic acidosis, leukocytosis  -Patient currently on 3 pressors, wean down as tolerated  -Bicarb drip, IVF  -Empiric antibiotics  -Blood cultures  -Pupils fixed dilated, no corneal reflex  -Occasional seizure activity  -Hypoxic brain injury very likely  -Rapid EEG  -Neurology consult when warranted  -CT head when warranted  -Intensivist/pulmonologist consulted  -Prognosis very poor  -Currently full code  -Will contact family when available  -Will follow along    Hypertension  Liver mass  Retroperitoneal mass    GI ppx    VTE Prophylaxis:  Pharmacologic VTE prophylaxis orders are present.        CODE STATUS:    Level Of Support Discussed With: Health Care Surrogate; Next of Kin (If No Surrogate)  Code Status  (Patient has no pulse and is not breathing): CPR (Attempt to Resuscitate)  Medical Interventions (Patient has pulse or is breathing): Full Support    Admission Status:  I believe this patient meets inpatient status.      Electronically signed by Drew Sánchez MD, 02/06/25, 5:15 AM EST.

## 2025-02-06 NOTE — CONSULTS
Pulmonary / Critical Care Consult Note      Patient Name: Harry Mistry  : 1955  MRN: [id number]  Primary Care Physician:  Bonilla Oliveros MD  Referring Physician: Jude Diaz MD  Date of admission: 2025    Subjective   Subjective     Reason for Consult/ Chief Complaint: Out-of-hospital cardiac arrest    HPI:  Harry Mistry is a 69 y.o. male with past medical history of hypertension, liver mass with retroperitoneal mass, failure to thrive residing at [facility] where patient was found with agonal respirations and unresponsive, EMS was called and upon their arrival patient was apneic, unresponsive and CPR was started.  Patient was resuscitated and transported to the emergency room.  Patient was also coded and with with CPR and ROSC while in the ED.  Patient was with hypotension and started on pressors.  Lab work revealed respiratory acidosis, metabolic acidosis.  Patient was started on broad-spectrum antibiotic coverage given concern for pneumonia and IV fluid resuscitation.  [facility] contacted next of kin who at that point had requested patient to remain a full code.  Patient was transferred from [facility] to our unit for critical care evaluation and management.  Pulmonary critical care has been consulted for further evaluation and treatment of this patient.    Review of Systems  Unable to obtain    Personal History     Past Medical History:   Diagnosis Date    Failure to thrive in adult     Hypertension     Liver mass     Retroperitoneal mass        No past surgical history on file.    Family History: family history is not on file. Otherwise pertinent FHx was reviewed and not pertinent to current issue.    Social History:  reports that he has been smoking cigarettes. He has a 10 pack-year smoking history. He has never used smokeless tobacco.    Home Medications:  Fluticasone-Umeclidin-Vilant, albuterol sulfate HFA, amLODIPine, apixaban, budesonide,  furosemide, ipratropium-albuterol, ondansetron ODT, and white petrolatum    Allergies:  Allergies   Allergen Reactions    Naproxen GI Intolerance       Objective    Objective     Vitals:   Temp:  [97.5 °F (36.4 °C)-100.9 °F (38.3 °C)] 100.9 °F (38.3 °C)  Heart Rate:  [] 126  Resp:  [26] 26  BP: ()/(42-70) 95/61  FiO2 (%):  [80 %-100 %] 80 %    Physical Exam:  Vital Signs Reviewed   Chronically ill male, unresponsive, myoclonic jerks noted  HEENT: Pupils are fixed and dilated, moist mucous membranes, ET tube in place  Chest:  good aeration, clear to auscultation bilaterally, equal rise and fall of chest  CV: RRR, no MGR  Abd:  Soft, NT, ND, + BS, no HSM  EXT:  no clubbing, no cyanosis, no edema, no joint tenderness  Neuro:  A&Ox0, unable to assess cranial nerve function.  No cough, no gag  Skin: No rashes or lesions noted      Result Review    Result Review:  I have personally reviewed the results from the time of this admission to 2/6/2025 13:49 EST and agree with these findings:  []  Laboratory  []  Microbiology  []  Radiology  []  EKG/Telemetry   []  Cardiology/Vascular   []  Pathology  []  Old records   []  Other:  Most notable findings include:       Lab 02/06/25  0505   WBC 34.90*   HEMOGLOBIN 12.6*   HEMATOCRIT 43.4   PLATELETS 404   SODIUM 138   POTASSIUM 4.2   CHLORIDE 96*   CO2 21.0*   BUN 24*   CREATININE 1.41*   GLUCOSE 183*   CALCIUM 8.3*   PHOSPHORUS 8.8*   TOTAL PROTEIN 6.1   ALBUMIN 2.8*   GLOBULIN 3.3         Assessment & Plan   Assessment / Plan     Active Hospital Problems:  Active Hospital Problems    Diagnosis     **Cardiac arrest     Acute respiratory failure with hypoxia and hypercapnia     Multifocal pneumonia     Pleural effusion, right     Shock, septic     Lactic acidosis    Acute hypercapnic respiratory failure with hypoxemia  RELL  Hyperglycemia  Transaminitis post arrest  Abnormal echo in  with elevated RVSP at 55 with mild to moderate pulmonary hypertension  Known  liver mass  Failure to thrive residing in SNF    Plan:    Currently on AC/VC settings, will continue  ABG reviewed.  No changes made to vent settings  Patient without cough or gag.  No corneal reflex noted  Obtain stat CT of head  Obtain EEG.  Ceribell performed overnight without acute findings per report  On Levophed, vasopressin and epinephrine to maintain MAP 65 or greater.  Goal to wean epinephrine off first, followed by vaso if able  Previous 2D echo reviewed from December 2024.  With normal EF of 67% and diastolic dysfunction normal patient was with mild to moderate pulmonary hypertension with RVS P at 55  Continue on bicarb drip started overnight  Trend renal function and electrolytes.  Replace as needed  Trend lactic till cleared  Neurology consult in place.  Palliative care consult in place.  Appreciate their assistance with discussions with family.  Patient is with overall poor prognosis.  Discussing DNR/DNI versus comfort care with family would be appropriate.  Started on empiric broad-spectrum antibiotic coverage with cefepime and vancomycin overnight.  Will de-escalate based on culture results  Blood cultures and sputum culture were ordered  Strep and Legionella urinary antigens were negative.  MRSA PCR was positive  Respiratory panel coronavirus positive.  UA unremarkable  Currently n.p.o.      I, BRYANT Bass spent 15minutes in accordance with split shared billing.  Electronically signed by BRYANT Molina, 02/06/25, 1:49 PM EST.      Pt is critically ill in ICU with out-of-hospital arrest, acute hypercapnic respiratory failure with hypoxemia, RELL, hyperglycemia, metabolic acidosis, lactic acidosis, transaminitis, history of liver mass with retroperitoneal mass I have [id number] of critical care time reviewing previous documentation, reviewing all pertinent telemetry, labs, and imaging studies, examining the patient, modifying the care plan and discussing the patient´s condition and care plan  with any available family, the primary service and during multidisciplinary rounds this morning at bedside. This does not include any procedures performed.

## 2025-02-06 NOTE — PROGRESS NOTES
RT EQUIPMENT DEVICE RELATED - SKIN ASSESSMENT    RT Medical Equipment/Device:     ETT Hernandez/Anchorfast    Skin Assessment:      Cheek:  Intact  Neck:  Intact  Lips:  Intact  Mouth:  Intact  Tongue:  Intact    Device Skin Pressure Protection:  Skin-to-device areas padded:  Anchorfast    Nurse Notification:  Melita Austin, CRT

## 2025-02-06 NOTE — NURSING NOTE
"Spoke with patients brother Giuseppe. He tells me he wants to stop  all mechanical interventions. He wishes his brother to be \"let go and let God take him home\". He tells me he knew this earlier today but they do not know what to do when he passes.  I explained the body would be taken to our morgue until arrangements can be made.   He has ask to proceed with comfort measures. He does not want to be here. \"I can't watch my last brother die\".  Emotional support provided.  Siomara OWENS notified. She is to make MD aware.  Tracey CARREON RN, BSN  Palliative Care  "

## 2025-02-06 NOTE — CONSULTS
Pt discussed during morning rounds. He is not responsive and has no cough or gag reflex, pupils fixed/dilated. GOC discussions with family ongoing, if pt is not made CMO he will need tube feeds. RD will continue to follow.

## 2025-02-06 NOTE — PROCEDURES
This is an inpatient,   Digitally recorded multi-montage EEG with leads placed according to the international 10/20 system  Photic stimulation was not done  Hyperventilation was not done    This EEG shows no good alpha activity but very fast low amplitude activity    Frequently during the EEG there are high amplitude waveforms but there is no real correlation before or after.  On a couple of occasions there may be a phase reversing to begin with but there was no after following postictal type change    No good alpha and nonspecific  Mostly asleep with spindles  No asymmetry    Nothing suggesting clear-cut epileptiform activity or asymmetry  No clinical events      Impression:    This is an abnormal adult, awake/drowsy/asleep EEG which showed diffuse slowing which is seen in patients with medication effect, encephalopathy     Some of the phase reversing activity was too sharp to be interictal rather looks like muscle   Cough or gag ?    no seizures but EEG like this does not rule out epilepsy

## 2025-02-06 NOTE — CONSULTS
Purpose of the visit was to evaluate for: goals of care/advanced care planning. Spoke with  Unable to speak to family Estephania Nexus was completed to find next of kin as no information was given upon transfer to this facility. I was unable to find any family members. Patient is a full code at this time.  Was able to speak to Jeannie(pts alleged POA) she has no papers to validate this. She is to contact patients brother. .      Assessment : Mr Mistry has a PMH of hypertension, liver mass, retroperitoneal mass, and failure to thrive was transferred to this facility from [facility] for management of septic shock, cardiac arrest and acute respiratory failure. He was admitted to [facility] from the nursing facility. He had suffered a cardia arrest.  He was intubated there.   I visited the CCU. Patient is intubated. On pressers to maintain hemodynamic stability. He has no cough or gag. His pupils are non responsive.   CT Scan and EEG pending.           Recommendations/Plan: Plans to meet with brother when he arrives.     Tasks Completed: Emotional Support.    Palliative care will continue to follow and support.  .Tracey CARREON RN, BSN  Palliative Care

## 2025-02-06 NOTE — PAYOR COMM NOTE
"UR DEPARTMENT    Carmen Weinberg RN  Phone [phone]  Fax [phone]    [address] [place] [address]    NPI [id number]  TAX ID [id number]    PHYSICIAN NAME AND NPI   KIMBERLI TAYLOR [id number]    BED TYPE  INPATIENT CRITICAL CARE    TYPE OF ADMISSION: DIRECT ADMISSION FROM ANOTHER FACILITY    ICD 10 CODE  I46.9    DATE OF ADMISSION 02/06/2025      Harry Sargent (69 y.o. Male)       Date of Birth   1955    Social Security Number       Address   [address] [facility] [address]    Home Phone   [phone]    MRN   [id number]       Restorationist   Unknown    Marital Status   Single                            Admission Date   2/6/25    Admission Type   Urgent    Admitting Provider   Kimberli Taylor MD    Attending Provider   Sherwin Javier DO    Department, Room/Bed   [facility] CORONARY CARE UNIT, C02/1       Discharge Date       Discharge Disposition       Discharge Destination                                 Attending Provider: Sherwin Javier DO    Allergies: Naproxen    Isolation: Contact Air   Infection: MRSA (02/06/25), COVID (confirmed) (02/06/25)   Code Status: CPR    Ht: 188 cm (74.02\")   Wt: 86.1 kg (189 lb 13.1 oz)    Admission Cmt: None   Principal Problem: Cardiac arrest [I46.9]                   Active Insurance as of 2/6/2025       Primary Coverage       Payor Plan Insurance Group Employer/Plan Group    ANTHEM MEDICARE REPLACEMENT ANTHEM MED ADV HMO [id number]       Payor Plan Address Payor Plan Phone Number Payor Plan Fax Number Effective Dates    [address] [phone]  1/1/2025 - None Entered    [facility] [id number]         Subscriber Name Subscriber Birth Date Member ID       HARRY SARGENT 1955 [id number]                     Emergency Contacts            No emergency contacts on file.           Respiratory Failure GRG Clinical Indications for Admission to Inpatient Care       Indications Met   Last updated by Lenard, " Carmen on 2/6/2025 0834     Review Status Created By   Primary Completed Carmen Weinberg      Criteria Review   Respiratory Failure GR Clinical Indications for Admission to Inpatient Care     Overall Determination: Indications Met     Criteria:  [×] Hospital admission is needed for appropriate care of the patient because of  1 or more  of the following  (1) (2) (3) (4) (5) (6) (7) (8):      [×] New (acute) need for mechanical invasive or noninvasive (eg, bilevel positive airway pressure (BPAP), volume-assured pressure support (VAPS), or volume control) ventilation (9) (10) (11) (12) (13) (14) (15)          2/6/2025  8:34 AM              -- 2/6/2025  8:34 AM by Carmen Weinberg --                  S/P Cardiac arrest. Intubated and placed on mech vent.      [×] Severe respiratory distress, as indicated by  1 or more  of the following :          [×] Altered mental status from respiratory disease              2/6/2025  8:34 AM                  -- 2/6/2025  8:34 AM by Carmen Weinberg --                                            (X) Altered mental status (ie, different from baseline), as indicated by  1 or more  of the following  (1) (2) (3) (4):                      (X) Coma (ie, not arousable)              2/6/2025  8:34 AM                  -- 2/6/2025  8:34 AM by Carmen Weinberg --                      Unresponsive      [×] Severe ventilation deficit, as indicated by  1 or more  of the following  (13) (18):          [×] Hypercarbia (PaCO2 greater than 40 mm Hg (5.3 kPa))-induced respiratory acidosis (pH less than 7.35)              2/6/2025  8:34 AM                  -- 2/6/2025  8:34 AM by Carmen Weinberg --                      PH 6.956, PCO2 93.2     Notes:  -- 2/6/2025  8:34 AM by Carmen Weinberg --      Subject: Admission      69 y.o. male with past medical history of hypertension, liver mass, retroperitoneal mass, and failure to thrive was transferred to this facility from [facility] for management of septic shock, cardiac arrest and  acute respiratory failure. Patient presented to outside facility from nursing home after staff found him unresponsive on the floor with agonal breathing and faint pulse. EMS was called immediately and on arrival patient was found to be bradycardic, apneic, unresponsive, and with fixed/dilated pupils. Patient coded en route to the ED CPR was started and was still pulseless on arrival to the ED. Patient was resuscitated in the ED and subsequently intubated and started on pressors. Chest x-ray showed bilateral pneumonia with effusion and labs showing lactic acidosis, leukocytosis and elevated CRP. Patient also started on empiric antibiotics and fluids. Due to lack of intensive care services patient was transferred here for higher level of care. At our facility patient arrived on dobutamine and epinephrine drips and was still hypotensive, hypoxic (86%), and tachycardic. Patient was on ventilator set to 100% oxygen. Labs showed that his lactic acid trended up to 13, with labs showing elevated procalcitonin, severe leukocytosis, anemia, reduced renal function, abnormal LFTs, hypoalbuminemia, UA with signs of dehydration, and ABG showing mixed hypercapnic respiratory failure with acidosis. X-ray showed large right pleural effusion along with bilateral infiltrates. When seen patient was intubated and on vent with fixed pupils and occasional seizure activity.                  Active Hospital Problems       Diagnosis       • **Cardiac arrest       • Acute respiratory failure with hypoxia and hypercapnia       • Multifocal pneumonia       • Pleural effusion, right       • Shock, septic       • Lactic acidosis             Plan:             Cardiac arrest/septic shock      -Admit intensive care unit      -Patient intubated and on vent      -Vent set to 100% oxygen      -Chest x-ray reviewed, large right pleural effusion      -ABG reviewed      -Severe lactic acidosis, leukocytosis      -Patient currently on 3 pressors, wean down  as tolerated      -Bicarb drip, IVF      -Empiric antibiotics      -Blood cultures      -Pupils fixed dilated, no corneal reflex      -Occasional seizure activity      -Hypoxic brain injury very likely      -Rapid EEG      -Neurology consult when warranted      -CT head when warranted      -Intensivist/pulmonologist consulted      -Prognosis very poor      -Currently full code      -Will contact family when available      -Will follow along            Hypertension      Liver mass      Retroperitoneal mass                           Finney: STEFANIA [id number] Tax ID [id number]     History & Physical        Drew Sánchez MD at [address] [place] [address]   HISTORY AND PHYSICAL    Patient Name: Harry Mistry  : 1955  MRN: [id number]  Primary Care Physician:  Bonilla Oliveros MD  Date of admission: 2025    Subjective  Subjective     Chief Complaint: Cardiac arrest, unresponsive, respiratory failure    HPI:    Harry Mistry is a 69 y.o. male with past medical history of hypertension, liver mass, retroperitoneal mass, and failure to thrive was transferred to this facility from [facility] for management of septic shock, cardiac arrest and acute respiratory failure.  Patient presented to outside facility from nursing home after staff found him unresponsive on the floor with agonal breathing and faint pulse.  EMS was called immediately and on arrival patient was found to be bradycardic, apneic, unresponsive, and with fixed/dilated pupils.  Patient coded en route to the ED CPR was started and was still pulseless on arrival to the ED. Patient was resuscitated in the ED and subsequently intubated and started on pressors.  Chest x-ray showed bilateral pneumonia with effusion and labs showing lactic acidosis, leukocytosis and elevated CRP.  Patient also started on empiric antibiotics and fluids.  Due to lack of intensive care services patient was transferred here for higher level of care.   Family was contacted at outside facility and requested that patient be full code going forward.  At our facility patient arrived on dobutamine and epinephrine drips and was still hypotensive, hypoxic (86%), and tachycardic.  Patient was on ventilator set to 100% oxygen.  Labs showed that his lactic acid trended up to 13, with labs showing elevated procalcitonin, severe leukocytosis, anemia, reduced renal function, abnormal LFTs, hypoalbuminemia, UA with signs of dehydration, and ABG showing mixed hypercapnic respiratory failure with acidosis.  X-ray showed large right pleural effusion along with bilateral infiltrates.  When seen patient was intubated and on vent with fixed pupils and occasional seizure activity.        Review of Systems   Patient intubated and on vent    Personal History     Past Medical History:   Diagnosis Date   • Failure to thrive in adult    • Hypertension    • Liver mass    • Retroperitoneal mass        No past surgical history on file.    Family History: family history is not on file. Otherwise pertinent FHx was reviewed and not pertinent to current issue.    Social History:  reports that he has been smoking cigarettes. He has a 10 pack-year smoking history. He has never used smokeless tobacco.    Home Medications:  albuterol sulfate HFA, amLODIPine, aspirin, diclofenac, furosemide, ipratropium-albuterol, and potassium chloride      Allergies:  Allergies   Allergen Reactions   • Naproxen GI Intolerance       Objective  Objective     Vitals:   Temp:  [97.7 °F (36.5 °C)] 97.7 °F (36.5 °C)  Heart Rate:  [] 99  BP: ()/(42-65) 129/65  Flow (L/min) (Oxygen Therapy):  [100] 100  FiO2 (%):  [100 %] 100 %  Physical Exam    Constitutional: Patient intubated and on vent   Eyes: Pupils fixed, nonreactive, no corneal reflex   HENT: NCAT, mucous membranes dry   Neck: Supple, no thyromegaly, no lymphadenopathy, trachea midline   Respiratory: Decreased breath sounds on the right, mechanical  respirations, rhonchi intubated and on vent   Cardiovascular: Tachycardia, no murmurs, rubs, or gallops, palpable pedal pulses bilaterally   Gastrointestinal: Hypoactive bowel sounds, soft, nondistended   Musculoskeletal: No bilateral ankle edema   Psychiatric: Intubated and on vent   Neurologic: Intubated on vent, dilated pupils, nonreactive, no corneal reflex   Skin: No rashes     Result Review   Result Review:  I have personally reviewed the results from the time of this admission to 2/6/2025 05:15 EST and agree with these findings:  [x]  Laboratory list / accordion  []  Microbiology  [x]  Radiology  [x]  EKG/Telemetry   []  Cardiology/Vascular   []  Pathology  []  Old records  Patient was resuscitated in the ED other:  Most notable findings include: Lactic acid trended up to 13, with labs showing elevated procalcitonin, severe leukocytosis, anemia, reduced renal function, abnormal LFTs, hypoalbuminemia, UA with signs of dehydration, and ABG showing mixed hypercapnic respiratory failure with acidosis.  X-ray showed large right pleural effusion along with bilateral infiltrates.      Assessment & Plan  Assessment / Plan     Brief Patient Summary:  Harry Mistry is a 69 y.o. male with past medical history of hypertension, liver mass, retroperitoneal mass, and failure to thrive was transferred to this facility from [facility] for management of septic shock, cardiac arrest and acute respiratory failure.     Active Hospital Problems:  Active Hospital Problems    Diagnosis    • **Cardiac arrest    • Acute respiratory failure with hypoxia and hypercapnia    • Multifocal pneumonia    • Pleural effusion, right    • Shock, septic    • Lactic acidosis      Plan:     Cardiac arrest/septic shock  -Admit intensive care unit  -Patient intubated and on vent  -Vent set to 100% oxygen  -Chest x-ray reviewed, large right pleural effusion  -ABG reviewed  -Severe lactic acidosis, leukocytosis  -Patient currently on 3  pressors, wean down as tolerated  -Bicarb drip, IVF  -Empiric antibiotics  -Blood cultures  -Pupils fixed dilated, no corneal reflex  -Occasional seizure activity  -Hypoxic brain injury very likely  -Rapid EEG  -Neurology consult when warranted  -CT head when warranted  -Intensivist/pulmonologist consulted  -Prognosis very poor  -Currently full code  -Will contact family when available  -Will follow along    Hypertension  Liver mass  Retroperitoneal mass    GI ppx    VTE Prophylaxis:  Pharmacologic VTE prophylaxis orders are present.        CODE STATUS:    Level Of Support Discussed With: Health Care Surrogate; Next of Kin (If No Surrogate)  Code Status (Patient has no pulse and is not breathing): CPR (Attempt to Resuscitate)  Medical Interventions (Patient has pulse or is breathing): Full Support    Admission Status:  I believe this patient meets inpatient status.      Electronically signed by Drew Sánchez MD, 02/06/25, 5:15 AM EST.    Electronically signed by Drew Sánchez MD at 02/06/25 0605       Current Facility-Administered Medications   Medication Dose Route Frequency Provider Last Rate Last Admin   • acetaminophen (TYLENOL) tablet 650 mg  650 mg Oral Q4H PRN Drew Sánchez MD        Or   • acetaminophen (TYLENOL) suppository 650 mg  650 mg Rectal Q4H PRN Drew Sánchez MD       • sennosides-docusate (PERICOLACE) 8.6-50 MG per tablet 2 tablet  2 tablet Oral BID Stanton Cobian MD        And   • polyethylene glycol (MIRALAX) packet 17 g  17 g Oral Daily PRN Stanton Cobian MD        And   • bisacodyl (DULCOLAX) EC tablet 5 mg  5 mg Oral Daily PRN Stanton Cobian MD        And   • bisacodyl (DULCOLAX) suppository 10 mg  10 mg Rectal Daily PRN Stanton Cobian MD       • cefepime 2000 mg IVPB in 100 mL NS (VTB)  2,000 mg Intravenous Q8H Drew Sánchez MD       • chlorhexidine (PERIDEX) 0.12 % solution 15 mL  15 mL Mouth/Throat Q12H Drew Sánchez MD       • fentaNYL  1000 mcg in 100 mL NS infusion   mcg/hr Intravenous Titrated Stanton Cobian MD   Held at 02/06/25 0549   • heparin (porcine) 5000 UNIT/ML injection 5,000 Units  5,000 Units Subcutaneous Q12H Drew Sánchez MD       • ipratropium-albuterol (DUO-NEB) nebulizer solution 3 mL  3 mL Nebulization Q4H PRN Drew Sánchez MD       • ipratropium-albuterol (DUO-NEB) nebulizer solution 3 mL  3 mL Nebulization TID - RT Drew Sánchez MD   3 mL at 02/06/25 0838   • [Held by provider] lactated ringers infusion  75 mL/hr Intravenous Continuous Drew Sánchez MD   Stopped at 02/06/25 0550   • mupirocin (BACTROBAN) 2 % nasal ointment 1 Application  1 Application Each Nare BID Drew Sánchez MD   1 Application at 02/06/25 0524   • nitroglycerin (NITROSTAT) SL tablet 0.4 mg  0.4 mg Sublingual Q5 Min PRN Drew Sánchez MD       • norepinephrine (LEVOPHED) 8 mg in 250 mL NS infusion (premix)  0.02-0.3 mcg/kg/min Intravenous Titrated Stanton Cobian MD 48.4 mL/hr at 02/06/25 0750 0.3 mcg/kg/min at 02/06/25 0750   • pantoprazole (PROTONIX) injection 40 mg  40 mg Intravenous Q AM Drew Sánchez MD   40 mg at 02/06/25 0601   • Pharmacy to dose vancomycin   Not Applicable Continuous PRN Drew Sánchez MD       • sodium bicarbonate 150 mEq/1000 mL D5W infusion  150 mEq Intravenous Continuous Drew Sánchez  mL/hr at 02/06/25 0524 150 mEq at 02/06/25 0524   • sodium chloride 0.9 % flush 10 mL  10 mL Intravenous Q12H Drew Sánchez MD   10 mL at 02/06/25 0905   • sodium chloride 0.9 % flush 10 mL  10 mL Intravenous PRN Drew Sánchez MD       • sodium chloride 0.9 % infusion 40 mL  40 mL Intravenous PRN Drew Sánchez MD       • vasopressin (PITRESSIN) 20 units in 100 mL NS infusion  0.03 Units/min Intravenous Continuous Marychuy Ortega PA 9 mL/hr at 02/06/25 0709 0.03 Units/min at 02/06/25 0709     Ventilator/Non-Invasive Ventilation Settings (From admission, onward)       Start     Ordered     02/06/25 0403  Ventilator - Vent Mode: AC/VC; Rate: Other; Rate: 26; FiO2: Titrate Per SpO2; Titrate Oxygen for SpO2: 90 - 95%; PEEP: 5; Tidal Volume: mL; TV: 500  Continuous        Question Answer Comment   Vent Mode AC/VC    Rate Other    Rate 26    FiO2 Titrate Per SpO2    Titrate Oxygen for SpO2 90 - 95%    PEEP 5    Tidal Volume mL            02/06/25 0403                  Physician Progress Notes (last 24 hours)  Notes from 02/05/25 0928 through 02/06/25 0928   No notes of this type exist for this encounter.       Consult Notes (last 24 hours)  Notes from 02/05/25 0928 through 02/06/25 0928   No notes of this type exist for this encounter.

## 2025-02-06 NOTE — NURSING NOTE
Patient brother arrived. Spoke with ICU MD. Brother has request a DNR and is to speak with wife to determine when patient will be made a CMO.   Orders for DNR

## 2025-02-06 NOTE — PROGRESS NOTES
13:34 EST  Patient admitted to hospital after having cardiac arrest at nursing home.  Discussed with patient's brother and his brother-in-law at bedside.  At baseline patient has been in rehab for more than a month now getting more and more weak and difficulty with breathing and on baseline oxygen.  Currently on high-dose vasopressors with severe pneumonia and likely some degree of injury to the brain due to cardiac arrest.  Having myoclonic jerks and unresponsive.  Poor cough and gag reflex as well.  Discussed with patient's brother and brother-in-law that given his poor baseline and the current injury patient will likely end up on tracheostomy and PEG tube placement even if he survives this hospital stay.  Discussed about patient's quality of life projection and asked family members about what patient would have wanted if he is looking at the situation.  Per family he would have never wanted to be dependent on support machines.  Patient's brother who is a POA would want to pursue comfort only care.

## 2025-02-06 NOTE — PLAN OF CARE
Goal Outcome Evaluation:           Progress: declining        Pt assessment per flowsheet. Medications per MAR. Pt arrived on unit 0400. Pt on Epi, dopamine and Fentanyl gtt's. Pt unresponsive. Labs drawn, reviewed. Supportive care provided. Report to Siomara OWENS.

## 2025-02-07 NOTE — DISCHARGE SUMMARY
[facility]         HOSPITALIST  DISCHARGE SUMMARY    Patient Name: Harry Mistry  : 1955  MRN: [id number]    Date of Admission: 2025  Date of DEATH:  2025 at 16:51    Primary Care Physician: Bonilla Oliveros MD    Consults       No orders found from 2025 to 2025.            Active and Resolved Hospital Problems:  Active Hospital Problems    Diagnosis POA    **Cardiac arrest [I46.9] Yes    Acute respiratory failure with hypoxia and hypercapnia [J96.01, J96.02] Unknown    Multifocal pneumonia [J18.9] Unknown    Pleural effusion, right [J90] Unknown    Shock, septic [A41.9, R65.21] Unknown    Lactic acidosis [E87.20] Unknown      Resolved Hospital Problems   No resolved problems to display.       Hospital Course     Hospital Course:  Harry Mistry is a 69 y.o. male with past medical history of hypertension, liver mass, retroperitoneal mass, and failure to thrive was transferred to this facility from [facility] for management of septic shock, cardiac arrest and acute respiratory failure.  Patient presented to outside facility from nursing home after staff found him unresponsive on the floor with agonal breathing and faint pulse.  Uncertain of how long patient was lying that way.  EMS was called immediately and on arrival patient was found to be bradycardic, apneic, unresponsive, and with fixed/dilated pupils.  Patient coded en route to the ED CPR was started and was still pulseless on arrival to the ED. Patient was resuscitated in the ED and subsequently intubated and started on pressors.  Chest x-ray showed bilateral pneumonia with effusion and labs showing lactic acidosis, leukocytosis and elevated CRP.  Patient also started on empiric antibiotics and fluids.  Due to lack of intensive care services patient was transferred here for higher level of care.  Family was contacted at outside facility and requested that patient be full code going forward.  At our  facility patient arrived on dobutamine and epinephrine drips and was still hypotensive, hypoxic (86%), and tachycardic.  Patient was on ventilator set to 100% oxygen.  Labs showed that his lactic acid trended up to 13, with labs showing elevated procalcitonin, severe leukocytosis, anemia, reduced renal function, abnormal LFTs, hypoalbuminemia, UA with signs of dehydration, and ABG showing mixed hypercapnic respiratory failure with acidosis.  X-ray showed large right pleural effusion along with bilateral infiltrates.  Patient also had seizure-like activity.  Patient's brother who is next of kin was contacted.  The intensivist discussed patient's condition with the brother and his brother-in-law.  Given patient's extremely poor prognosis and prolonged resuscitation and the fact that patient had some degree of brain injury patient's family decided to withdraw care.  Care was withdrawn and patient passed away very quickly.            Day of Discharge         Discharge Details           Allergies   Allergen Reactions    Naproxen GI Intolerance       Discharge Disposition:                  CODE STATUS:  Code Status and Medical Interventions: No CPR (Do Not Attempt to Resuscitate); Comfort Measures   Ordered at: 25 9257     Level Of Support Discussed With:    Next of Kin (If No Surrogate)     Code Status (Patient has no pulse and is not breathing):    No CPR (Do Not Attempt to Resuscitate)     Medical Interventions (Patient has pulse or is breathing):    Comfort Measures         No future appointments.        Pertinent  and/or Most Recent Results     PROCEDURES:   SEE CHART     LAB RESULTS:      Lab 25  1123 25  0823 25  0505 25  0428   WBC  --   --  34.90*  --    HEMOGLOBIN  --   --  12.6*  --    HEMATOCRIT  --   --  43.4  --    PLATELETS  --   --  404  --    NEUTROS ABS  --   --  30.01*  --    MCV  --   --  98.0*  --    PROCALCITONIN  --   --  5.54*  --    LACTATE 5.6* 8.3* 9.9* 9.0*          Lab 02/06/25  0505   SODIUM 138   POTASSIUM 4.2   CHLORIDE 96*   CO2 21.0*   ANION GAP 21.0*   BUN 24*   CREATININE 1.41*   EGFR 54.6*   GLUCOSE 183*   CALCIUM 8.3*   MAGNESIUM 2.0   PHOSPHORUS 8.8*         Lab 02/06/25  0505   TOTAL PROTEIN 6.1   ALBUMIN 2.8*   GLOBULIN 3.3   ALT (SGPT) 372*   AST (SGOT) 779*   BILIRUBIN 0.5   ALK PHOS 347*         Lab 02/06/25  0505   PROBNP 3,910.0*                 Lab 02/06/25  0610 02/06/25  0428   PH, ARTERIAL 7.087* 6.956*   PCO2, ARTERIAL 77.4* 93.2*   PO2 ART 88.7 84.0   O2 SATURATION ART 91.8* 86.4*   FIO2 90 100   HCO3 ART 23.4 20.8*   BASE EXCESS ART -8.1* -13.2*     Brief Urine Lab Results  (Last result in the past 365 days)        Color   Clarity   Blood   Leuk Est   Nitrite   Protein   CREAT   Urine HCG        02/06/25 0512 Dark Yellow   Clear   Negative   Negative   Negative   30 mg/dL (1+)                 Microbiology Results (last 10 days)       Procedure Component Value - Date/Time    Respiratory Panel PCR w/COVID-19(SARS-CoV-2) [facility] In-House, NP Swab in UTM/VTM, 2 HR TAT - Swab, Nasopharynx [[id number]]  (Abnormal) Collected: 02/06/25 0516    Lab Status: Final result Specimen: Swab from Nasopharynx Updated: 02/06/25 0735     ADENOVIRUS, PCR Not Detected     Coronavirus 229E Not Detected     Coronavirus HKU1 Not Detected     Coronavirus NL63 Not Detected     Coronavirus OC43 Detected     COVID19 Detected     Human Metapneumovirus Not Detected     Human Rhinovirus/Enterovirus Not Detected     Influenza A PCR Not Detected     Influenza B PCR Not Detected     Parainfluenza Virus 1 Not Detected     Parainfluenza Virus 2 Not Detected     Parainfluenza Virus 3 Not Detected     Parainfluenza Virus 4 Not Detected     RSV, PCR Not Detected     Bordetella pertussis pcr Not Detected     Bordetella parapertussis PCR Not Detected     Chlamydophila pneumoniae PCR Not Detected     Mycoplasma pneumo by PCR Not Detected    Narrative:      In the setting  of a positive respiratory panel with a viral infection PLUS a negative procalcitonin without other underlying concern for bacterial infection, consider observing off antibiotics or discontinuation of antibiotics and continue supportive care. If the respiratory panel is positive for atypical bacterial infection (Bordetella pertussis, Chlamydophila pneumoniae, or Mycoplasma pneumoniae), consider antibiotic de-escalation to target atypical bacterial infection.    MRSA Screen, PCR (Inpatient) - Swab, Nares [[id number]]  (Abnormal) Collected: 02/06/25 0516    Lab Status: Final result Specimen: Swab from Nares Updated: 02/06/25 0734     MRSA PCR MRSA Detected    Narrative:      The negative predictive value of this diagnostic test is high and should only be used to consider de-escalating anti-MRSA therapy. A positive result may indicate colonization with MRSA and must be correlated clinically.    Legionella Antigen, Urine - Urine, Urine, Clean Catch [[id number]]  (Normal) Collected: 02/06/25 0512    Lab Status: Final result Specimen: Urine, Clean Catch Updated: 02/06/25 0709     LEGIONELLA ANTIGEN, URINE Negative    S. Pneumo Ag Urine or CSF - Urine, Urine, Clean Catch [[id number]]  (Normal) Collected: 02/06/25 0512    Lab Status: Final result Specimen: Urine, Clean Catch Updated: 02/06/25 0710     Strep Pneumo Ag Negative    Respiratory Culture - Sputum, ET Suction [[id number]] Collected: 02/06/25 0433    Lab Status: Preliminary result Specimen: Sputum from ET Suction Updated: 02/06/25 0851     Gram Stain Occasional WBCs seen      Rare (1+) Gram positive cocci    COVID-19, [facility] IN-HOUSE,NASAL Swab (NO TRANSPORT MEDIA) 2 HR TAT - , [[id number]] Collected: 02/06/25 0005    Lab Status: Final result Updated: 02/06/25 0400    Influenza Antigen, Rapid - , [[id number]] Collected: 02/06/25 0005    Lab Status: Final result Updated: 02/06/25 0400            CT Head Without Contrast    Result Date: 2/6/2025  Impression: 1.Small focal  round/ovoid mildly hyperdense area in the right periventricular white matter nonspecific in appearance may represent a developmental venous anomaly or minimal parenchymal bleed. May consider further evaluation with a contrast-enhanced CT or  MRI brain or short follow-up 2.Chronic right caudate head lacunar infarct. Electronically Signed: Lefty Gonzalez MD  2/6/2025 8:35 AM EST  Workstation ID: [id number]    XR Abdomen KUB    Result Date: 2/6/2025  OG tube tip in the stomach. Electronically Signed: Harshil Quinones MD  2/6/2025 5:28 AM EST  Workstation ID: [id number]    XR Chest 1 View    Result Date: 2/6/2025  1.Large right pleural effusion. 2.Bilateral infiltrates, concerning for pneumonia although pulmonary edema could also be present. Overall similar to prior. Electronically Signed: Harshil Quinones MD  2/6/2025 5:28 AM EST  Workstation ID: [id number]    XR Chest 1 View    Result Date: 2/6/2025  Endotracheal tube in situ.  Interval progression of the right sided pleural effusion, as well as the inhomogeneous opacities in both lungs fields.  [facility] ER was called at [phone] at 11:24 PM CST, 02/05/2025 and Dr. Diaz was informed regarding the presence of significant medical findings in the report.                  Labs Pending at Discharge:  Pending Labs       Order Current Status    Blood Culture - Blood, Arm, Left In process    Blood Culture - Blood, Hand, Left In process    Respiratory Culture - Sputum, ET Suction Preliminary result                  Electronically signed by Sherwin Javier DO, 02/06/25, 8:16 PM EST.

## 2025-02-08 LAB
BACTERIA SPEC RESP CULT: ABNORMAL
BACTERIA SPEC RESP CULT: ABNORMAL
GRAM STN SPEC: ABNORMAL
GRAM STN SPEC: ABNORMAL

## 2025-02-11 LAB
BACTERIA SPEC AEROBE CULT: NORMAL
BACTERIA SPEC AEROBE CULT: NORMAL